# Patient Record
Sex: MALE | Race: WHITE | Employment: FULL TIME | URBAN - METROPOLITAN AREA
[De-identification: names, ages, dates, MRNs, and addresses within clinical notes are randomized per-mention and may not be internally consistent; named-entity substitution may affect disease eponyms.]

---

## 2017-12-23 ENCOUNTER — APPOINTMENT (OUTPATIENT)
Dept: GENERAL RADIOLOGY | Facility: CLINIC | Age: 32
End: 2017-12-23
Attending: FAMILY MEDICINE

## 2017-12-23 ENCOUNTER — HOSPITAL ENCOUNTER (EMERGENCY)
Facility: CLINIC | Age: 32
Discharge: HOME | End: 2017-12-23
Attending: FAMILY MEDICINE | Admitting: FAMILY MEDICINE

## 2017-12-23 VITALS
BODY MASS INDEX: 29.8 KG/M2 | SYSTOLIC BLOOD PRESSURE: 130 MMHG | RESPIRATION RATE: 16 BRPM | HEIGHT: 72 IN | WEIGHT: 220 LBS | DIASTOLIC BLOOD PRESSURE: 83 MMHG | OXYGEN SATURATION: 99 % | TEMPERATURE: 97.5 F

## 2017-12-23 DIAGNOSIS — S20.211A RIB CONTUSION, RIGHT, INITIAL ENCOUNTER: ICD-10-CM

## 2017-12-23 PROCEDURE — 76705 ECHO EXAM OF ABDOMEN: CPT | Performed by: FAMILY MEDICINE

## 2017-12-23 PROCEDURE — 76705 ECHO EXAM OF ABDOMEN: CPT | Mod: 26 | Performed by: FAMILY MEDICINE

## 2017-12-23 PROCEDURE — 76604 US EXAM CHEST: CPT | Mod: 26 | Performed by: FAMILY MEDICINE

## 2017-12-23 PROCEDURE — 76604 US EXAM CHEST: CPT | Performed by: FAMILY MEDICINE

## 2017-12-23 PROCEDURE — 71101 X-RAY EXAM UNILAT RIBS/CHEST: CPT | Mod: TC,LT

## 2017-12-23 PROCEDURE — 99285 EMERGENCY DEPT VISIT HI MDM: CPT | Mod: 25 | Performed by: FAMILY MEDICINE

## 2017-12-23 PROCEDURE — 93308 TTE F-UP OR LMTD: CPT | Mod: 26 | Performed by: FAMILY MEDICINE

## 2017-12-23 PROCEDURE — 99284 EMERGENCY DEPT VISIT MOD MDM: CPT | Mod: 25 | Performed by: FAMILY MEDICINE

## 2017-12-23 PROCEDURE — 93308 TTE F-UP OR LMTD: CPT | Performed by: FAMILY MEDICINE

## 2017-12-23 RX ORDER — ACETAMINOPHEN 500 MG
1000 TABLET ORAL EVERY 6 HOURS PRN
Refills: 0 | COMMUNITY
Start: 2017-12-23

## 2017-12-23 RX ORDER — IBUPROFEN 200 MG
600 TABLET ORAL EVERY 6 HOURS PRN
COMMUNITY
Start: 2017-12-23

## 2017-12-23 NOTE — ED AVS SNAPSHOT
[facility] Emergency Department    [address] [place] [address]    Phone:  [phone]    Fax:  [phone]                                       Jin Shin   MRN: [id number]    Department:  [facility] Emergency Department   Date of Visit:  12/23/2017           After Visit Summary Signature Page     I have received my discharge instructions, and my questions have been answered. I have discussed any challenges I see with this plan with the nurse or doctor.    ..........................................................................................................................................  Patient/Patient Representative Signature      ..........................................................................................................................................  Patient Representative Print Name and Relationship to Patient    ..................................................               ................................................  Date                                            Time    ..........................................................................................................................................  Reviewed by Signature/Title    ...................................................              ..............................................  Date                                                            Time

## 2017-12-23 NOTE — ED AVS SNAPSHOT
[facility] Emergency Department    [address] [place] [address]    Phone:  [phone]    Fax:  [phone]                                       Jin Shin   MRN: [id number]    Department:  [facility] Emergency Department   Date of Visit:  12/23/2017           Patient Information     Date Of Birth          1985        Your diagnoses for this visit were:     Rib contusion, right, initial encounter no fracture seen on xray today       You were seen by Pb Arnold MD.      Follow-up Information     Follow up with Cecilio Prasad MD.    Specialty:  Family Practice    Why:  As needed    Contact information:    [facility]   [place] [address] [phone]          Discharge Instructions       Ice 20 minutes per hour, (20 minutes on, 40 minutes off) at least 4 times a day.   Remember to take at least 10 deep breaths every hour while awake.  Ibuprofen as needed for pain.  Recheck in clinic if persistent problems.  Return to the ED if worse/concerns.  It was nice visiting with you this morning.   I'm glad you were not hurt more severely and hope you heal quickly.  Cecilia Acosta!    Thank you for choosing [facility]. We appreciate the opportunity to meet your urgent medical needs. Please let us know if we could have done anything to make your stay more satisfying.    After discharge, please closely monitor for any new or worsening symptoms. Return to the Emergency Department if you develop any acute worsening signs or symptoms.    If you had lab work, cultures or imaging studies done during your stay, the final results may still be pending. We will call you if your plan of care needs to change. However, if you are not improving as expected, please follow up with your primary care provider or clinic.     Start any prescription medications that were prescribed to you and take them as directed.     Please see additional handouts that may be  pertinent to your condition.        Rib Contusion     A rib contusion is a bruise to one or more rib bones. It may cause pain, tenderness, swelling and a purplish discoloration. There may be a sharp pain while breathing.  You will be assessed for other injuries. You will likely be given pain medicine. Rib contusions heal on their own, without further treatment. However, pain may take weeks to months to go away.   Note that a small crack (fracture) in the rib may cause the same symptoms as a rib contusion. The small crack may not be seen on a chest X-ray. However, the conditions are managed in the same way.  Home care    Rest. Avoid heavy lifting, strenuous exertion, or any activity that causes pain.    Ice the area to reduce pain and swelling. Put ice cubes in a plastic bag or use a cold pack. (Wrap the cold source in a thin towel. Do not place it directly on your skin.) Ice the injured area for 20 minutes every 1 to 2 hours the first day. Continue with ice packs 3 to 4 times a day for the next 2 days, then as needed for the relief of pain and swelling.    Take any prescribed pain medicine as directed by your healthcare provider. If none was prescribed, take acetaminophen, ibuprofen, or naproxen to control pain.    If you have a significant injury, you may be given a device called an incentive spirometer to keep your lungs healthy. Use as directed.  Follow-up care  Follow up with your healthcare provider during the next week or as directed.  When to seek medical advice  Call your healthcare provider for any of the following:    Shortness of breath or trouble breathing    Increasing chest pain with breathing    Coughing    Dizziness, weakness, or fainting    New or worsening pain    Fever of 100.4 F (38 C) or higher, or as directed by your healthcare provider  Date Last Reviewed: 2/1/2017 2000-2017 [employer]. [address] [place] [address], [place] [address]. All rights reserved. This information is not  intended as a substitute for professional medical care. Always follow your healthcare professional's instructions.          24 Hour Appointment Hotline       To make an appointment at any [facility], call [phone] ([phone]). If you don't have a family doctor or clinic, we will help you find one. [facility] are conveniently located to serve the needs of you and your family.             Review of your medicines      START taking        Dose / Directions Last dose taken    acetaminophen 500 MG tablet   Commonly known as:  TYLENOL   Dose:  1000 mg        Take 2 tablets (1,000 mg) by mouth every 6 hours as needed for pain or fever   Refills:  0        ibuprofen 200 MG tablet   Commonly known as:  ADVIL/MOTRIN   Dose:  600 mg        Take 3 tablets (600 mg) by mouth every 6 hours as needed for pain   Refills:  0                Prescriptions were sent or printed at these locations (2 Prescriptions)                   [facility] [place] [address] [facility]    [address] [facility] [address]    Telephone:  [phone]   Fax:  [phone]   Hours:                  Not Printed or Sent (2 of 2)         ibuprofen (ADVIL/MOTRIN) 200 MG tablet               acetaminophen (TYLENOL) 500 MG tablet                Procedures and tests performed during your visit     POC US ABDOMEN LIMITED    Ribs XR, unilat 3 views + PA chest,  left      Orders Needing Specimen Collection     None      Pending Results     Date and Time Order Name Status Description    12/23/2017 0834 POC US ABDOMEN LIMITED In process             Pending Culture Results     No orders found from 12/21/2017 to 12/24/2017.            Pending Results Instructions     If you had any lab results that were not finalized at the time of your Discharge, you can call the ED Lab Result RN at [phone]. You will be contacted by this team for any positive Lab results or changes in treatment. The nurses are available 7 days a week  "from 10A to 6:30P.  You can leave a message 24 hours per day and they will return your call.        Thank you for choosing [facility]       Thank you for choosing [facility] for your care. Our goal is always to provide you with excellent care. Hearing back from our patients is one way we can continue to improve our services. Please take a few minutes to complete the written survey that you may receive in the mail after you visit with us. Thank you!        [facility] Information     [employer] lets you send messages to your doctor, view your test results, renew your prescriptions, schedule appointments and more. To sign up, go to [email or web address] . Click on \"Log in\" on the left side of the screen, which will take you to the Welcome page. Then click on \"Sign up Now\" on the right side of the page.     You will be asked to enter the access code listed below, as well as some personal information. Please follow the directions to create your username and password.     Your access code is: [email or web address]  Expires: 3/23/2018 11:22 AM     Your access code will  in 90 days. If you need help or a new code, please call your [facility] clinic or [phone].        Care EveryWhere ID     This is your Care EveryWhere ID. This could be used by other organizations to access your [facility] medical records  [id number]        Equal Access to Services     ANIL RUSSELL : Hadii dee Rodriges, waaxda luqadaha, qaybta kaalmada lizy, nathan nixon. So [facility] [phone].    ATENCIÓN: Si habla español, tiene a pandya disposición servicios gratuitos de asistencia lingüística. Llame al [phone].    We comply with applicable federal civil rights laws and [place] laws. We do not discriminate on the basis of race, color, national origin, age, disability, sex, sexual orientation, or gender identity.            After Visit Summary       This is your record. Keep this with you and show to your community " pharmacist(s) and doctor(s) at your next visit.

## 2017-12-23 NOTE — DISCHARGE INSTRUCTIONS
Ice 20 minutes per hour, (20 minutes on, 40 minutes off) at least 4 times a day.   Remember to take at least 10 deep breaths every hour while awake.  Ibuprofen as needed for pain.  Recheck in clinic if persistent problems.  Return to the ED if worse/concerns.  It was nice visiting with you this morning.   I'm glad you were not hurt more severely and hope you heal quickly.  Cecilia Acosta!    Thank you for choosing [facility]. We appreciate the opportunity to meet your urgent medical needs. Please let us know if we could have done anything to make your stay more satisfying.    After discharge, please closely monitor for any new or worsening symptoms. Return to the Emergency Department if you develop any acute worsening signs or symptoms.    If you had lab work, cultures or imaging studies done during your stay, the final results may still be pending. We will call you if your plan of care needs to change. However, if you are not improving as expected, please follow up with your primary care provider or clinic.     Start any prescription medications that were prescribed to you and take them as directed.     Please see additional handouts that may be pertinent to your condition.        Rib Contusion     A rib contusion is a bruise to one or more rib bones. It may cause pain, tenderness, swelling and a purplish discoloration. There may be a sharp pain while breathing.  You will be assessed for other injuries. You will likely be given pain medicine. Rib contusions heal on their own, without further treatment. However, pain may take weeks to months to go away.   Note that a small crack (fracture) in the rib may cause the same symptoms as a rib contusion. The small crack may not be seen on a chest X-ray. However, the conditions are managed in the same way.  Home care    Rest. Avoid heavy lifting, strenuous exertion, or any activity that causes pain.    Ice the area to reduce pain and swelling. Put ice  cubes in a plastic bag or use a cold pack. (Wrap the cold source in a thin towel. Do not place it directly on your skin.) Ice the injured area for 20 minutes every 1 to 2 hours the first day. Continue with ice packs 3 to 4 times a day for the next 2 days, then as needed for the relief of pain and swelling.    Take any prescribed pain medicine as directed by your healthcare provider. If none was prescribed, take acetaminophen, ibuprofen, or naproxen to control pain.    If you have a significant injury, you may be given a device called an incentive spirometer to keep your lungs healthy. Use as directed.  Follow-up care  Follow up with your healthcare provider during the next week or as directed.  When to seek medical advice  Call your healthcare provider for any of the following:    Shortness of breath or trouble breathing    Increasing chest pain with breathing    Coughing    Dizziness, weakness, or fainting    New or worsening pain    Fever of 100.4 F (38 C) or higher, or as directed by your healthcare provider  Date Last Reviewed: 2/1/2017 2000-2017 [employer]. [address] [place] [address], [place] [address]. All rights reserved. This information is not intended as a substitute for professional medical care. Always follow your healthcare professional's instructions.

## 2017-12-23 NOTE — ED PROVIDER NOTES
History     Chief Complaint   Patient presents with     Rib Pain     HPI  Jin Shin is a 32 year old male who sense to the ED with right rib pain.  He stood on the rear tire of his pickup and was leaning into the box to  a propane tank, which he does all the time.  As he lifted the propane tank, his foot slipped off the wheel and his right ribs slammed into the top edge of the wall of his truck bed.  This happened around 5 AM this morning.  He has severe pain in the right lower anterior lateral ribs.  More pain with movement or deep inspiration.  Hurts to take a deep breath.  Denies any abdominal pain.  No other injuries.  Has not taken anything for the pain.  States he is typically in excellent health and heals quickly.  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    Problem List:    There are no active problems to display for this patient.       Past Medical History:    History reviewed. No pertinent past medical history.    Past Surgical History:    History reviewed. No pertinent surgical history.    Family History:    No family history on file.    Social History:  Marital Status:  Single [1]  Social History   Substance Use Topics     Smoking status: Current Every Day Smoker     Packs/day: 1.00     Smokeless tobacco: Current User     Alcohol use Yes      Comment: soc        Medications:      ibuprofen (ADVIL/MOTRIN) 200 MG tablet   acetaminophen (TYLENOL) 500 MG tablet         Review of Systems   All other systems reviewed and are negative.      Physical Exam   BP: 136/90  Heart Rate: 90  Temp: 97.5  F (36.4  C)  Resp: 16  Height: 182.9 cm (6')  Weight: 99.8 kg (220 lb)  SpO2: 99 %      Physical Exam   Constitutional: He is oriented to person, place, and time. He appears well-developed and well-nourished. He appears distressed (mild).   HENT:   Head: Normocephalic.   Eyes: EOM are normal.   Neck: Neck supple.   Cardiovascular: Normal rate and regular rhythm.     Pulmonary/Chest: He has no wheezes. He exhibits tenderness (right lower ant/lat ribs).   Splinting with deep insp.  No crepitus   Abdominal: Soft. There is no tenderness (specifically, no RUQ tenderness).   Musculoskeletal: Normal range of motion.   Neurological: He is alert and oriented to person, place, and time. No cranial nerve deficit.   Skin: Skin is warm and dry.   Psychiatric: He has a normal mood and affect.       ED Course    FAST exam was negative.    Right rib x-rays ordered.    Right rib x-rays and chest x-ray showed no evidence of rib fractures and no pneumothorax.         ED Course     Procedures    Results for orders placed or performed during the hospital encounter of 12/23/17 (from the past 24 hour(s))   POC US ABDOMEN LIMITED    Impression    Bedside FAST (Focused Assessment with Sonography in Trauma), performed and interpreted by me.   Indication: Trauma    With the patient in Trendelenburg, the RUQ, LUQ and subxiphoid views were examined for intraabdominal and thoracic free fluid and pericardial effusion. With the patient in reverse Trendelenburg, the suprapubic view was examined for intraabdominal free fluid. Image quality was satisfactory..     Findings: There is no evidence of free fluid above or below bilateral diaphragms, in the splenorenal or hepatorenal space, or in bilateral paracolic gutters. There was no free fluid seen in the pelvis adjacent to the urinary bladder. There is no free fluid within the pericardium.   Extended FAST exam (eFAST):   Indication: Trauma   The chest wall was evaluated for evidence of pneumothorax.     Right side:  Lung sliding artifact  Present        Left side:  Lung sliding artifact  Present          IMPRESSION:  Negative FAST     Ribs XR, unilat 3 views + PA chest,  left    Narrative    CHEST AND RIGHT RIBS 4 VIEWS   12/23/2017 8:59 AM     HISTORY: right rib pain after fall    COMPARISON: None.      Impression    IMPRESSION: Normal.    YUE WOLFE MD              Assessments & Plan (with Medical Decision Making)    (S20.378F) Rib contusion, right, initial encounter  Comment: no fracture seen on xray today  Plan: Verbal and written discharge instructions given.  He wants to try to get by with ibuprofen and Tylenol.  Declined any narcotics even at bedtime.  He will follow-up if his condition worsens or changes.          I have reviewed the nursing notes.    I have reviewed the findings, diagnosis, plan and need for follow up with the patient.       Discharge Medication List as of 12/23/2017 11:23 AM      START taking these medications    Details   ibuprofen (ADVIL/MOTRIN) 200 MG tablet Take 3 tablets (600 mg) by mouth every 6 hours as needed for pain, OTC      acetaminophen (TYLENOL) 500 MG tablet Take 2 tablets (1,000 mg) by mouth every 6 hours as needed for pain or fever, R-0, OTC             Final diagnoses:   Rib contusion, right, initial encounter - no fracture seen on xray today       12/23/2017   [facility] EMERGENCY DEPARTMENT     Pb Arnold MD  12/23/17 8729

## 2018-04-09 ENCOUNTER — HOSPITAL ENCOUNTER (EMERGENCY)
Facility: CLINIC | Age: 33
Discharge: HOME | End: 2018-04-09
Attending: FAMILY MEDICINE | Admitting: FAMILY MEDICINE

## 2018-04-09 VITALS
HEIGHT: 72 IN | RESPIRATION RATE: 15 BRPM | HEART RATE: 80 BPM | OXYGEN SATURATION: 98 % | TEMPERATURE: 98.6 F | DIASTOLIC BLOOD PRESSURE: 114 MMHG | SYSTOLIC BLOOD PRESSURE: 154 MMHG | WEIGHT: 200 LBS | BODY MASS INDEX: 27.09 KG/M2

## 2018-04-09 DIAGNOSIS — F32.A DEPRESSION, UNSPECIFIED DEPRESSION TYPE: ICD-10-CM

## 2018-04-09 DIAGNOSIS — G47.00 INSOMNIA, UNSPECIFIED TYPE: ICD-10-CM

## 2018-04-09 LAB
ALBUMIN SERPL-MCNC: 4.6 G/DL (ref 3.4–5)
ALBUMIN UR-MCNC: NEGATIVE MG/DL
ALP SERPL-CCNC: 99 U/L (ref 40–150)
ALT SERPL W P-5'-P-CCNC: 20 U/L (ref 0–70)
AMPHETAMINES UR QL: NOT DETECTED NG/ML
ANION GAP SERPL CALCULATED.3IONS-SCNC: 9 MMOL/L (ref 3–14)
APAP SERPL-MCNC: <2 MG/L (ref 10–20)
APPEARANCE UR: CLEAR
AST SERPL W P-5'-P-CCNC: 17 U/L (ref 0–45)
BARBITURATES UR QL SCN: NOT DETECTED NG/ML
BASOPHILS # BLD AUTO: 0 10E9/L (ref 0–0.2)
BASOPHILS NFR BLD AUTO: 0.1 %
BENZODIAZ UR QL SCN: NOT DETECTED NG/ML
BILIRUB SERPL-MCNC: 0.8 MG/DL (ref 0.2–1.3)
BILIRUB UR QL STRIP: NEGATIVE
BUN SERPL-MCNC: 8 MG/DL (ref 7–30)
BUPRENORPHINE UR QL: NOT DETECTED NG/ML
CALCIUM SERPL-MCNC: 9.2 MG/DL (ref 8.5–10.1)
CANNABINOIDS UR QL: ABNORMAL NG/ML
CHLORIDE SERPL-SCNC: 105 MMOL/L (ref 94–109)
CO2 SERPL-SCNC: 25 MMOL/L (ref 20–32)
COCAINE UR QL SCN: NOT DETECTED NG/ML
COLOR UR AUTO: YELLOW
CREAT SERPL-MCNC: 0.68 MG/DL (ref 0.66–1.25)
D-METHAMPHET UR QL: NOT DETECTED NG/ML
DIFFERENTIAL METHOD BLD: NORMAL
EOSINOPHIL # BLD AUTO: 0.1 10E9/L (ref 0–0.7)
EOSINOPHIL NFR BLD AUTO: 0.5 %
ERYTHROCYTE [DISTWIDTH] IN BLOOD BY AUTOMATED COUNT: 13.2 % (ref 10–15)
ETHANOL SERPL-MCNC: <0.01 G/DL
GFR SERPL CREATININE-BSD FRML MDRD: >90 ML/MIN/1.7M2
GLUCOSE SERPL-MCNC: 87 MG/DL (ref 70–99)
GLUCOSE UR STRIP-MCNC: NEGATIVE MG/DL
HCT VFR BLD AUTO: 46.7 % (ref 40–53)
HGB BLD-MCNC: 16.1 G/DL (ref 13.3–17.7)
HGB UR QL STRIP: NEGATIVE
IMM GRANULOCYTES # BLD: 0 10E9/L (ref 0–0.4)
IMM GRANULOCYTES NFR BLD: 0.2 %
KETONES UR STRIP-MCNC: 5 MG/DL
LEUKOCYTE ESTERASE UR QL STRIP: NEGATIVE
LYMPHOCYTES # BLD AUTO: 2 10E9/L (ref 0.8–5.3)
LYMPHOCYTES NFR BLD AUTO: 19.1 %
MCH RBC QN AUTO: 30.4 PG (ref 26.5–33)
MCHC RBC AUTO-ENTMCNC: 34.5 G/DL (ref 31.5–36.5)
MCV RBC AUTO: 88 FL (ref 78–100)
METHADONE UR QL SCN: NOT DETECTED NG/ML
MONOCYTES # BLD AUTO: 0.8 10E9/L (ref 0–1.3)
MONOCYTES NFR BLD AUTO: 7.8 %
NEUTROPHILS # BLD AUTO: 7.4 10E9/L (ref 1.6–8.3)
NEUTROPHILS NFR BLD AUTO: 72.3 %
NITRATE UR QL: NEGATIVE
OPIATES UR QL SCN: NOT DETECTED NG/ML
OXYCODONE UR QL SCN: NOT DETECTED NG/ML
PCP UR QL SCN: NOT DETECTED NG/ML
PH UR STRIP: 6 PH (ref 5–7)
PLATELET # BLD AUTO: 258 10E9/L (ref 150–450)
POTASSIUM SERPL-SCNC: 3.6 MMOL/L (ref 3.4–5.3)
PROPOXYPH UR QL: NOT DETECTED NG/ML
PROT SERPL-MCNC: 8.1 G/DL (ref 6.8–8.8)
RBC # BLD AUTO: 5.3 10E12/L (ref 4.4–5.9)
RBC #/AREA URNS AUTO: ABNORMAL /HPF (ref 0–2)
SALICYLATES SERPL-MCNC: 5 MG/DL
SODIUM SERPL-SCNC: 139 MMOL/L (ref 133–144)
SOURCE: ABNORMAL
SP GR UR STRIP: 1.01 (ref 1–1.03)
TRICYCLICS UR QL SCN: NOT DETECTED NG/ML
TSH SERPL DL<=0.005 MIU/L-ACNC: 2.44 MU/L (ref 0.4–4)
UROBILINOGEN UR STRIP-MCNC: 0 MG/DL (ref 0–2)
WBC # BLD AUTO: 10.3 10E9/L (ref 4–11)
WBC #/AREA URNS AUTO: ABNORMAL /HPF (ref 0–5)

## 2018-04-09 PROCEDURE — 80329 ANALGESICS NON-OPIOID 1 OR 2: CPT | Performed by: FAMILY MEDICINE

## 2018-04-09 PROCEDURE — 85025 COMPLETE CBC W/AUTO DIFF WBC: CPT | Performed by: FAMILY MEDICINE

## 2018-04-09 PROCEDURE — 99285 EMERGENCY DEPT VISIT HI MDM: CPT | Mod: 25 | Performed by: FAMILY MEDICINE

## 2018-04-09 PROCEDURE — 84443 ASSAY THYROID STIM HORMONE: CPT | Performed by: FAMILY MEDICINE

## 2018-04-09 PROCEDURE — 90791 PSYCH DIAGNOSTIC EVALUATION: CPT

## 2018-04-09 PROCEDURE — 80320 DRUG SCREEN QUANTALCOHOLS: CPT | Performed by: FAMILY MEDICINE

## 2018-04-09 PROCEDURE — 80306 DRUG TEST PRSMV INSTRMNT: CPT | Performed by: FAMILY MEDICINE

## 2018-04-09 PROCEDURE — 99285 EMERGENCY DEPT VISIT HI MDM: CPT | Mod: Z6 | Performed by: FAMILY MEDICINE

## 2018-04-09 PROCEDURE — 81001 URINALYSIS AUTO W/SCOPE: CPT | Performed by: FAMILY MEDICINE

## 2018-04-09 PROCEDURE — 80053 COMPREHEN METABOLIC PANEL: CPT | Performed by: FAMILY MEDICINE

## 2018-04-09 RX ORDER — TRAZODONE HYDROCHLORIDE 50 MG/1
25-50 TABLET, FILM COATED ORAL
Qty: 20 TABLET | Refills: 0 | Status: SHIPPED | OUTPATIENT
Start: 2018-04-09

## 2018-04-09 RX ORDER — TRAZODONE HYDROCHLORIDE 50 MG/1
25-50 TABLET, FILM COATED ORAL
Qty: 20 TABLET | Refills: 0 | Status: SHIPPED | OUTPATIENT
Start: 2018-04-09 | End: 2018-04-09

## 2018-04-09 ASSESSMENT — ENCOUNTER SYMPTOMS
SHORTNESS OF BREATH: 0
COUGH: 0
FEVER: 0

## 2018-04-09 NOTE — ED AVS SNAPSHOT
[facility] Emergency Department    [address] [place] [address]    Phone:  [phone]    Fax:  [phone]                                       Jin Shin   MRN: [id number]    Department:  [facility] Emergency Department   Date of Visit:  4/9/2018           Patient Information     Date Of Birth          1985        Your diagnoses for this visit were:     Depression, unspecified depression type     Insomnia, unspecified type        You were seen by Pb Arnold MD.      Follow-up Information     Follow up with Justin Smith MD On 4/16/2018.    Specialty:  Family Practice    Why:  as scheduled    Contact information:    [place] [address] [phone]          Discharge Instructions       Expect a call from the mental health coordinator to arrange for a therapy appointment.  Get your application in for medical assistance.  Recheck in clinic next week.    Oftentimes therapy and medications together are more effective than one or the other alone.  You may use the trazodone at bedtime to help you sleep.  Start with 25-50 mg.  You could go up to 100 mg at bedtime if needed.  Please return to the ED if you develop any feelings of hurting yourself or others.  It was nice visiting with you and your dad tonight.  I hope you feel better soon and wish you the very best going forward.    Thank you for choosing [facility]. We appreciate the opportunity to meet your urgent medical needs. Please let us know if we could have done anything to make your stay more satisfying.    After discharge, please closely monitor for any new or worsening symptoms. Return to the Emergency Department if you develop any acute worsening signs or symptoms.    If you had lab work, cultures or imaging studies done during your stay, the final results may still be pending. We will call you if your plan of care needs to change. However, if you are not  "improving as expected, please follow up with your primary care provider or clinic.     Start any prescription medications that were prescribed to you and take them as directed.     Please see additional handouts that may be pertinent to your condition.    NO HARM CONTRACT  Based on your exam today, you do not appear to be at risk for harming yourself at this time. You will be able to leave this facility and be responsible for yourself. As an extra protection, you are asked to make an agreement with your doctor to \"do no harm\" to yourself.   This is a contract between me and my doctor.  By signing this contract, I agree TO DO NO HARM to myself.   If I have the thought that I want to harm myself, I agree to do one of the following right away:  -- Call the Suicide Hotline:  1-748.133.2593 or       National Suicide Prevention Lifeline: 1-327.797.9923  -- Call 911  -- Return to this facility immediately    _______________________________________________  Patient    _______________________________________________  Witness        0984-4748 [employer] [facility] [employer], [address] [place] [address]. All rights reserved. This information is not intended as a substitute for professional medical care. Always follow your healthcare professional's instructions.      Your next 10 appointments already scheduled     Apr 16, 2018  1:00 PM CDT   [facility]    [address] [place] [address] [phone]           Bring a current list of meds and any records pertaining to this visit. For Physicals, please bring immunization records and any forms needing to be filled out. Please arrive 10 minutes early to complete paperwork.              24 Hour Appointment Hotline       To make an appointment at any [facility], call [phone] ([phone]). If you don't have a family doctor or clinic, we will help you find one. " [facility] are conveniently located to serve the needs of you and your family.             Review of your medicines      START taking        Dose / Directions Last dose taken    traZODone 50 MG tablet   Commonly known as:  DESYREL   Dose:  25-50 mg   Quantity:  20 tablet        Take 0.5-1 tablets (25-50 mg) by mouth nightly as needed for sleep   Refills:  0          Our records show that you are taking the medicines listed below. If these are incorrect, please call your family doctor or clinic.        Dose / Directions Last dose taken    acetaminophen 500 MG tablet   Commonly known as:  TYLENOL   Dose:  1000 mg        Take 2 tablets (1,000 mg) by mouth every 6 hours as needed for pain or fever   Refills:  0        ibuprofen 200 MG tablet   Commonly known as:  ADVIL/MOTRIN   Dose:  600 mg        Take 3 tablets (600 mg) by mouth every 6 hours as needed for pain   Refills:  0                Prescriptions were sent or printed at these locations (1 Prescription)                   [facility] - [address] [address]    Telephone:  [phone]   Fax:  [phone]   Hours:                  E-Prescribed (1 of 1)         traZODone (DESYREL) 50 MG tablet                Procedures and tests performed during your visit     Acetaminophen level    Alcohol level blood    CBC with platelets differential    Comprehensive metabolic panel    Salicylate level    TSH with free T4 reflex    UA with Microscopic    Urine Drugs of Abuse Screen Panel 13      Orders Needing Specimen Collection     None      Pending Results     No orders found from 4/7/2018 to 4/10/2018.            Pending Culture Results     No orders found from 4/7/2018 to 4/10/2018.            Pending Results Instructions     If you had any lab results that were not finalized at the time of your Discharge, you can call the ED Lab Result RN at [phone]. You will be contacted by this team  "for any positive Lab results or changes in treatment. The nurses are available 7 days a week from 10A to 6:30P.  You can leave a message 24 hours per day and they will return your call.        Thank you for choosing [facility]       Thank you for choosing [facility] for your care. Our goal is always to provide you with excellent care. Hearing back from our patients is one way we can continue to improve our services. Please take a few minutes to complete the written survey that you may receive in the mail after you visit with us. Thank you!        [facility] Information     [email or web address] lets you send messages to your doctor, view your test results, renew your prescriptions, schedule appointments and more. To sign up, go to [email or web address] . Click on \"Log in\" on the left side of the screen, which will take you to the Welcome page. Then click on \"Sign up Now\" on the right side of the page.     You will be asked to enter the access code listed below, as well as some personal information. Please follow the directions to create your username and password.     Your access code is: [email or web address]  Expires: 2018 10:35 PM     Your access code will  in 90 days. If you need help or a new code, please call your [facility] clinic or [phone].        Care EveryWhere ID     This is your Care EveryWhere ID. This could be used by other organizations to access your [facility] medical records  [id number]        Equal Access to Services     ANIL RUSSELL : Hadii dee haaso Sogio, waaxda luqadaha, qaybta kaalmada adejunyada, nathan bean . So [facility] [phone].    ATENCIÓN: Si habla español, tiene a pandya disposición servicios gratuitos de asistencia lingüística. Claudio al [phone].    We comply with applicable federal civil rights laws and [place] laws. We do not discriminate on the basis of race, color, national origin, age, disability, sex, sexual orientation, or gender identity.          "   After Visit Summary       This is your record. Keep this with you and show to your community pharmacist(s) and doctor(s) at your next visit.

## 2018-04-09 NOTE — ED AVS SNAPSHOT
[facility] Emergency Department    [address] [place] [address]    Phone:  [phone]    Fax:  [phone]                                       Jin Shin   MRN: [id number]    Department:  [facility] Emergency Department   Date of Visit:  4/9/2018           After Visit Summary Signature Page     I have received my discharge instructions, and my questions have been answered. I have discussed any challenges I see with this plan with the nurse or doctor.    ..........................................................................................................................................  Patient/Patient Representative Signature      ..........................................................................................................................................  Patient Representative Print Name and Relationship to Patient    ..................................................               ................................................  Date                                            Time    ..........................................................................................................................................  Reviewed by Signature/Title    ...................................................              ..............................................  Date                                                            Time

## 2018-04-10 NOTE — ED NOTES
Reviewed discharge instructions with pt.  Pt signed safety contract from DEC.  Pt has his belongings.  Father is with pt for discharge. No additional questions or concerns.

## 2018-04-10 NOTE — DISCHARGE INSTRUCTIONS
"Expect a call from the mental health coordinator to arrange for a therapy appointment.  Get your application in for medical assistance.  Recheck in clinic next week.    Oftentimes therapy and medications together are more effective than one or the other alone.  You may use the trazodone at bedtime to help you sleep.  Start with 25-50 mg.  You could go up to 100 mg at bedtime if needed.  Please return to the ED if you develop any feelings of hurting yourself or others.  It was nice visiting with you and your dad tonight.  I hope you feel better soon and wish you the very best going forward.    Thank you for choosing [facility]. We appreciate the opportunity to meet your urgent medical needs. Please let us know if we could have done anything to make your stay more satisfying.    After discharge, please closely monitor for any new or worsening symptoms. Return to the Emergency Department if you develop any acute worsening signs or symptoms.    If you had lab work, cultures or imaging studies done during your stay, the final results may still be pending. We will call you if your plan of care needs to change. However, if you are not improving as expected, please follow up with your primary care provider or clinic.     Start any prescription medications that were prescribed to you and take them as directed.     Please see additional handouts that may be pertinent to your condition.    NO HARM CONTRACT  Based on your exam today, you do not appear to be at risk for harming yourself at this time. You will be able to leave this facility and be responsible for yourself. As an extra protection, you are asked to make an agreement with your doctor to \"do no harm\" to yourself.   This is a contract between me and my doctor.  By signing this contract, I agree TO DO NO HARM to myself.   If I have the thought that I want to harm myself, I agree to do one of the following right away:  -- Call the Suicide Hotline:  " 1-590.938.2581 or       National Suicide Prevention Lifeline: 1-467.756.7194  -- Call 911  -- Return to this facility immediately    _______________________________________________  Patient    _______________________________________________  Witness        3790-4659 [employer] [facility], [address] [place] [address], [place] [address]. All rights reserved. This information is not intended as a substitute for professional medical care. Always follow your healthcare professional's instructions.
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
## 2018-04-10 NOTE — ED NOTES
"Patient states he \"is not okay\". Feeling like he doesn't want to wake up, years of crap and life is falling apart.  Brought in by father.  Never been to the doctor for any type of depression issues.  Had insomnia after being [place] 9 years ago and was on ambien for a short time.  Now has not slept for 3 days.  Asked his dad to bring him in for help, doesn't feel like a good person anymore  "

## 2020-10-26 ENCOUNTER — VIRTUAL VISIT (OUTPATIENT)
Dept: FAMILY MEDICINE | Facility: OTHER | Age: 35
End: 2020-10-26

## 2020-10-26 DIAGNOSIS — Z20.822 SUSPECTED 2019 NOVEL CORONAVIRUS INFECTION: ICD-10-CM

## 2020-10-26 DIAGNOSIS — Z20.822 SUSPECTED 2019 NOVEL CORONAVIRUS INFECTION: Primary | ICD-10-CM

## 2020-10-26 PROCEDURE — U0003 INFECTIOUS AGENT DETECTION BY NUCLEIC ACID (DNA OR RNA); SEVERE ACUTE RESPIRATORY SYNDROME CORONAVIRUS 2 (SARS-COV-2) (CORONAVIRUS DISEASE [COVID-19]), AMPLIFIED PROBE TECHNIQUE, MAKING USE OF HIGH THROUGHPUT TECHNOLOGIES AS DESCRIBED BY CMS-2020-01-R: HCPCS | Performed by: FAMILY MEDICINE

## 2020-10-26 NOTE — PROGRESS NOTES
"Date: 10/26/2020 09:20:50  Clinician: Rosalio Cardoza  Clinician NPI: [id number]  Patient: Jin Shin  Patient : 1985  Patient Address: [facility] [place] [address]  Patient Phone: [phone]  Visit Protocol: URI  Patient Summary:  Jin is a 35 year old ( : 1985 ) male who initiated a [facility] Visit for COVID-19 (Coronavirus) evaluation and screening. When asked the question \"Please sign me up to receive news, health information and promotions from [facility].\", Jin responded \"No\".    Jin states his symptoms started today.   His symptoms consist of a headache, vomiting, nausea, myalgia, chills, malaise, and diarrhea. Jin also feels feverish but was unable to measure his temperature.   Symptom details   Headache: He states the headache is moderate (4-6 on a 10 point pain scale).    Jin denies having ear pain, wheezing, enlarged lymph nodes, cough, nasal congestion, anosmia, rhinitis, facial pain or pressure, sore throat, teeth pain, and ageusia. He also denies having a sinus infection within the past year, taking antibiotic medication in the past month, and having recent facial or sinus surgery in the past 60 days. He is not experiencing dyspnea.   Precipitating events  He has not recently been exposed to someone with influenza. Jin has not been in close contact with any high risk individuals.   Pertinent COVID-19 (Coronavirus) information  In the past 14 days, Jin has not worked in a congregate living setting.   He does not work or volunteer as healthcare worker or a  and does not work or volunteer in a healthcare facility.   Jin also has not lived in a congregate living setting in the past 14 days. He does not live with a healthcare worker.   Jin has not had a close contact with a laboratory-confirmed COVID-19 patient within 14 days of symptom onset.   Since 2019, Jin and has not had upper respiratory infection or influenza-like illness. Has not been diagnosed " with lab-confirmed COVID-19 test   Pertinent medical history  Jin needs a return to work/school note.   Weight: 230 lbs   Jin smokes or uses smokeless tobacco.   Weight: 230 lbs    MEDICATIONS: No current medications, ALLERGIES: NKDA  Clinician Response:  Dear Jin,   Your symptoms show that you may have coronavirus (COVID-19). This illness can cause fever, cough and trouble breathing. Many people get a mild case and get better on their own. Some people can get very sick.  What should I do?  We would like to test you for this virus.   1. Please call [phone] to schedule your visit. Explain that you were referred by [facility] to have a COVID-19 test. Be ready to share your [facility] visit ID number.  Please note that if you are assessed for Covid-19 testing and receive an order for testing from [facility], that the scheduling of your Covid test at [facility] may be delayed by three or four days or more due to limited availability for testing. Additional options for testing can be found on the [place] Covid-19 Response website. https://[place].gov/covid19/    The following will serve as your written order for this COVID Test, ordered by me, for the indication of suspected COVID [Z20.828]: The test will be ordered in [facility], our electronic health record, after you are scheduled. It will show as ordered and authorized by Jose Alvarez MD.  Order: COVID-19 (Coronavirus) PCR for SYMPTOMATIC testing from [facility].   2. When it's time for your COVID test:  Stay at least 6 feet away from others. (If someone will drive you to your test, stay in the backseat, as far away from the  as you can.)   Cover your mouth and nose with a mask, tissue or washcloth.  Go straight to the testing site. Don't make any stops on the way there or back.      3.Starting now: Stay home and away from others (self-isolate) until:   You've had no fever---and no medicine that reduces fever---for one full day (24 hours). And...   Your other symptoms have  "gotten better. For example, your cough or breathing has improved. And...   At least 10 days have passed since your symptoms started.       During this time, don't leave the house except for testing or medical care.   Stay in your own room, even for meals. Use your own bathroom if you can.   Stay away from others in your home. No hugging, kissing or shaking hands. No visitors.  Don't go to work, school or anywhere else.    Clean \"high touch\" surfaces often (doorknobs, counters, handles, etc.). Use a household cleaning spray or wipes. You'll find a full list of  on the EPA website: www.epa.gov/pesticide-registration/list-n-disinfectants-use-against-sars-cov-2.   Cover your mouth and nose with a mask, tissue or washcloth to avoid spreading germs.  Wash your hands and face often. Use soap and water.  Caregivers in these groups are at risk for severe illness due to COVID-19:  o People 65 years and older  o People who live in a nursing home or long-term care facility  o People with chronic disease (lung, heart, cancer, diabetes, kidney, liver, immunologic)  o People who have a weakened immune system, including those who:   Are in cancer treatment  Take medicine that weakens the immune system, such as corticosteroids  Had a bone marrow or organ transplant  Have an immune deficiency  Have poorly controlled HIV or AIDS  Are obese (body mass index of 40 or higher)  Smoke regularly   o Caregivers should wear gloves while washing dishes, handling laundry and cleaning bedrooms and bathrooms.  o Use caution when washing and drying laundry: Don't shake dirty laundry, and use the warmest water setting that you can.  o For more tips, go to www.cdc.gov/coronavirus/2019-ncov/downloads/10Things.pdf.       How can I take care of myself?   Get lots of rest. Drink extra fluids (unless a doctor has told you not to).   Take Tylenol (acetaminophen) for fever or pain. If you have liver or kidney problems, ask your family doctor if " it's okay to take Tylenol.   Adults can take either:    650 mg (two 325 mg pills) every 4 to 6 hours, or...   1,000 mg (two 500 mg pills) every 8 hours as needed.    Note: Don't take more than 3,000 mg in one day. Acetaminophen is found in many medicines (both prescribed and over-the-counter medicines). Read all labels to be sure you don't take too much.   For children, check the Tylenol bottle for the right dose. The dose is based on the child's age or weight.    If you have other health problems (like cancer, heart failure, an organ transplant or severe kidney disease): Call your specialty clinic if you don't feel better in the next 2 days.       Know when to call 911. Emergency warning signs include:    Trouble breathing or shortness of breath Pain or pressure in the chest that doesn't go away Feeling confused like you haven't felt before, or not being able to wake up Bluish-colored lips or face.  Where can I get more information?   [facility] -- About COVID-19: [email or web address]   CDC -- What to Do If You're Sick: www.cdc.gov/coronavirus/2019-ncov/about/steps-when-sick.html   CDC -- Ending Home Isolation: www.cdc.gov/coronavirus/2019-ncov/hcp/disposition-in-home-patients.html   [facility] -- Caring for Someone: www.cdc.gov/coronavirus/2019-ncov/if-you-are-sick/care-for-someone.html   [facility] -- Interim Guidance for Hospital Discharge to Home: www.health.[facility].[place]/diseases/coronavirus/hcp/hospdischarge.pdf   [facility] clinical trials (COVID-19 research studies): clinicalaffairs.[facility].edu/um-clinical-trials    Below are the COVID-19 hotlines at the [place] Department of Health ([facility]). Interpreters are available.    For health questions: Call 424-785-2178 or 1-931.830.1174 (7 a.m. to 7 p.m.) For questions about schools and childcare: Call 384-593-4230 or 1-769.868.1272 (7 a.m. to 7 p.m.)    Diagnosis: Contact with and (suspected) exposure to other viral communicable diseases  Diagnosis  ICD: Z20.828

## 2020-10-27 LAB
SARS-COV-2 RNA SPEC QL NAA+PROBE: NOT DETECTED
SPECIMEN SOURCE: NORMAL

## 2021-05-30 ENCOUNTER — HEALTH MAINTENANCE LETTER (OUTPATIENT)
Age: 36
End: 2021-05-30

## 2021-09-19 ENCOUNTER — HEALTH MAINTENANCE LETTER (OUTPATIENT)
Age: 36
End: 2021-09-19

## 2022-06-26 ENCOUNTER — HEALTH MAINTENANCE LETTER (OUTPATIENT)
Age: 37
End: 2022-06-26

## 2022-11-21 ENCOUNTER — HEALTH MAINTENANCE LETTER (OUTPATIENT)
Age: 37
End: 2022-11-21

## 2023-07-09 ENCOUNTER — HEALTH MAINTENANCE LETTER (OUTPATIENT)
Age: 38
End: 2023-07-09